# Patient Record
Sex: FEMALE | Race: WHITE | NOT HISPANIC OR LATINO | URBAN - METROPOLITAN AREA
[De-identification: names, ages, dates, MRNs, and addresses within clinical notes are randomized per-mention and may not be internally consistent; named-entity substitution may affect disease eponyms.]

---

## 2023-09-29 ENCOUNTER — DOCUMENTATION (OUTPATIENT)
Dept: PRIMARY CARE | Facility: CLINIC | Age: 88
End: 2023-09-29

## 2023-09-29 NOTE — PROGRESS NOTES
Current Care Plan 8/9/2023    Subjective:  Chief Complaints:     1. *[facility].  HPI:     General Comments:          Spoke with danelle who states that everything is going well for her at this time. No questions or concern she is agreeable to follow up calls and is encouraged ot reach out with any problems that may arise.  Medical History:     Objective:    Assessment:    Plan:  Procedures:      Care Plan:          ORIGINATING             ESTELA/HESHAM Name:  Cleo Aden RN         REASON FOR REFERRAL            Problems:  Safety Issues, Emergency Dept Utilization, Frequent Admissions         PRIMARY             Contact:  Self         IDENTIFIED BARRIERS            Safety:  falls, history of 1 fall in past 6 months         PATIENT GOALS            Common:  1. Patient will verbalize understanding of chronic condition, self management plan for the next:, 2. Patient will take all prescribed medications routinely without missing doses for the next:, 3. Patient will be able to verbalize flare-up symptoms for the next:, 4. Patient will be able to verbalize the importance of calling provider right away for flare-up symptoms for the next:            Falls:  1. no falls, 2. will report all falls to provider            High Emergency Dept utilization:  1. Patient will verbalize understanding of the Right Place for Care for next:, 2. Patient will have reduction of ED encounters for next:, 3. Patient will verbalize appropriate use of ED versus Urgent Care for next:, 4. Patient will call doctor office prior to presenting to ED except for acute onset of chest pain, shortness of breath, trauma for next:         PLAN/INTERVENTIONS            Falls:  Medication review, Proper use of Durable Medical Equipment (walker, cane, oxygen, etc), Floors clear of clutter, remove throw rugs, Have night lights, Wearing nonskid footwear            High Emergency Dept utilization:  Provide education on the right place for care  (office, urg care, ED), Mail Right Place for Care to patient, Discuss calling doctor office if uncertain where to go, Monitor ED activity over next 30 days, Educate patient to call doctor office first, even after hours         CARE PLAN COMPLETED WITH PATIENT/FAMILY INPUT            Copy provided to patient/family:  Yes

## 2023-10-31 NOTE — PROGRESS NOTES
Patient doing well & does not want to incur cost for ongoing Case Management services. Based on this information, will close case at this time.